# Patient Record
Sex: FEMALE | Race: OTHER | Employment: OTHER | URBAN - METROPOLITAN AREA
[De-identification: names, ages, dates, MRNs, and addresses within clinical notes are randomized per-mention and may not be internally consistent; named-entity substitution may affect disease eponyms.]

---

## 2018-10-12 ENCOUNTER — PREPPED CHART (OUTPATIENT)
Dept: URBAN - METROPOLITAN AREA CLINIC 47 | Facility: CLINIC | Age: 68
End: 2018-10-12

## 2018-10-15 ENCOUNTER — NEW PATIENT COMPREHENSIVE (OUTPATIENT)
Dept: URBAN - METROPOLITAN AREA CLINIC 47 | Facility: CLINIC | Age: 68
End: 2018-10-15

## 2018-10-15 DIAGNOSIS — H25.11: ICD-10-CM

## 2018-10-15 DIAGNOSIS — H25.12: ICD-10-CM

## 2018-10-15 PROCEDURE — 92004 COMPRE OPH EXAM NEW PT 1/>: CPT

## 2018-10-15 PROCEDURE — 92015 DETERMINE REFRACTIVE STATE: CPT

## 2018-10-15 ASSESSMENT — TONOMETRY
OD_IOP_MMHG: 14
OS_IOP_MMHG: 14

## 2018-10-15 ASSESSMENT — VISUAL ACUITY
OS_PH: 20/30
OD_SC: J12
OS_SC: J12
OS_CC: J1
OS_SC: 20/100-1
OS_CC: 20/40
OD_SC: 20/200
OD_CC: J1
OD_CC: 20/30-2

## 2018-10-31 ENCOUNTER — CATARACT CONSULT (OUTPATIENT)
Dept: URBAN - METROPOLITAN AREA CLINIC 43 | Facility: CLINIC | Age: 68
End: 2018-10-31

## 2018-10-31 VITALS
SYSTOLIC BLOOD PRESSURE: 105 MMHG | HEIGHT: 55 IN | RESPIRATION RATE: 14 BRPM | HEART RATE: 80 BPM | DIASTOLIC BLOOD PRESSURE: 66 MMHG

## 2018-10-31 DIAGNOSIS — H18.51: ICD-10-CM

## 2018-10-31 DIAGNOSIS — H25.811: ICD-10-CM

## 2018-10-31 DIAGNOSIS — H25.812: ICD-10-CM

## 2018-10-31 PROCEDURE — 92286 ANT SGM IMG I&R SPECLR MIC: CPT

## 2018-10-31 PROCEDURE — V2799I IMPRIMIS

## 2018-10-31 PROCEDURE — 92025-2 CORNEAL TOPOGRAPHY, PT

## 2018-10-31 PROCEDURE — 92136TC INTERFEROMETRY - TECHNICAL COMPONENT

## 2018-10-31 PROCEDURE — 99214 OFFICE O/P EST MOD 30 MIN: CPT

## 2018-10-31 ASSESSMENT — VISUAL ACUITY
OS_CC: J2
OS_SC: 20/100
OS_SC: J12
OD_BAT: 20/80
OS_CC: 20/30-2
OD_CC: J2
OD_SC: J12
OD_CC: 20/40-2
OS_BAT: 20/80
OS_AM: 20/20
OD_PAM: 20/20
OD_SC: 20/200

## 2018-10-31 ASSESSMENT — TONOMETRY
OS_IOP_MMHG: 14
OD_IOP_MMHG: 15

## 2018-11-28 ENCOUNTER — SURGERY/PROCEDURE (OUTPATIENT)
Dept: URBAN - METROPOLITAN AREA CLINIC 43 | Facility: CLINIC | Age: 68
End: 2018-11-28

## 2018-11-28 ENCOUNTER — PRE-OP/H&P (OUTPATIENT)
Dept: URBAN - METROPOLITAN AREA CLINIC 39 | Facility: CLINIC | Age: 68
End: 2018-11-28

## 2018-11-28 VITALS
SYSTOLIC BLOOD PRESSURE: 105 MMHG | DIASTOLIC BLOOD PRESSURE: 66 MMHG | RESPIRATION RATE: 14 BRPM | HEART RATE: 80 BPM | HEIGHT: 55 IN

## 2018-11-28 DIAGNOSIS — H25.811: ICD-10-CM

## 2018-11-28 PROCEDURE — 4040F PNEUMOC VAC/ADMIN/RCVD: CPT

## 2018-11-28 PROCEDURE — G8783 BP SCRN PERF REC INTERVAL: HCPCS

## 2018-11-28 PROCEDURE — 1036F TOBACCO NON-USER: CPT

## 2018-11-28 PROCEDURE — 66984CV REMOVE CATARACT, INSERT LENS, CUSTOM VISION

## 2018-11-28 PROCEDURE — 66999LNSR LENSAR LASER FOR CAT SX

## 2018-11-28 PROCEDURE — G8418 CALC BMI BLW LOW PARAM F/U: HCPCS

## 2018-11-28 PROCEDURE — 99211T TECH SERVICE

## 2018-11-28 PROCEDURE — G9903 PT SCRN TBCO ID AS NON USER: HCPCS

## 2018-11-28 PROCEDURE — G8482 FLU IMMUNIZE ORDER/ADMIN: HCPCS

## 2018-11-28 PROCEDURE — G8427 DOCREV CUR MEDS BY ELIG CLIN: HCPCS

## 2018-11-29 ENCOUNTER — POST OP/EVAL OF SECOND EYE (OUTPATIENT)
Dept: URBAN - METROPOLITAN AREA CLINIC 43 | Facility: CLINIC | Age: 68
End: 2018-11-29

## 2018-11-29 DIAGNOSIS — Z96.1: ICD-10-CM

## 2018-11-29 PROCEDURE — P6698455 NON-COMANAGED ADVANCED PO

## 2018-11-29 ASSESSMENT — VISUAL ACUITY
OS_BAT: 20/80
OD_SC: J6
OS_SC: J12
OD_PH: 20/50
OS_PH: 20/60-1
OS_SC: 20/100-1
OD_SC: 20/60+1

## 2018-11-29 ASSESSMENT — TONOMETRY
OS_IOP_MMHG: 15
OD_IOP_MMHG: 24

## 2018-12-04 ENCOUNTER — PRE-OP/H&P (OUTPATIENT)
Dept: URBAN - METROPOLITAN AREA CLINIC 39 | Facility: CLINIC | Age: 68
End: 2018-12-04

## 2018-12-04 ENCOUNTER — SURGERY/PROCEDURE (OUTPATIENT)
Dept: URBAN - METROPOLITAN AREA CLINIC 43 | Facility: CLINIC | Age: 68
End: 2018-12-04

## 2018-12-04 VITALS
RESPIRATION RATE: 14 BRPM | HEIGHT: 55 IN | DIASTOLIC BLOOD PRESSURE: 66 MMHG | SYSTOLIC BLOOD PRESSURE: 105 MMHG | HEART RATE: 80 BPM

## 2018-12-04 DIAGNOSIS — H25.812: ICD-10-CM

## 2018-12-04 DIAGNOSIS — Z96.1: ICD-10-CM

## 2018-12-04 PROCEDURE — 99211T TECH SERVICE

## 2018-12-04 PROCEDURE — G8783 BP SCRN PERF REC INTERVAL: HCPCS

## 2018-12-04 PROCEDURE — 65772LRI LRI DURING CAT SX

## 2018-12-04 PROCEDURE — 66999LNSR LENSAR LASER FOR CAT SX

## 2018-12-04 PROCEDURE — 4040F PNEUMOC VAC/ADMIN/RCVD: CPT

## 2018-12-04 PROCEDURE — G8482 FLU IMMUNIZE ORDER/ADMIN: HCPCS

## 2018-12-04 PROCEDURE — G9903 PT SCRN TBCO ID AS NON USER: HCPCS

## 2018-12-04 PROCEDURE — G8427 DOCREV CUR MEDS BY ELIG CLIN: HCPCS

## 2018-12-04 PROCEDURE — 66984CV REMOVE CATARACT, INSERT LENS, CUSTOM VISION

## 2018-12-04 PROCEDURE — G8418 CALC BMI BLW LOW PARAM F/U: HCPCS

## 2018-12-04 PROCEDURE — 1036F TOBACCO NON-USER: CPT

## 2018-12-04 ASSESSMENT — TONOMETRY
OS_IOP_MMHG: 14
OD_IOP_MMHG: 14

## 2018-12-04 ASSESSMENT — VISUAL ACUITY
OD_SC: J3
OD_SC: 20/30
OS_SC: 20/100
OS_SC: J12

## 2018-12-05 ENCOUNTER — CATARACT POST-OP 1-DAY (OUTPATIENT)
Dept: URBAN - METROPOLITAN AREA CLINIC 47 | Facility: CLINIC | Age: 68
End: 2018-12-05

## 2018-12-05 DIAGNOSIS — Z96.1: ICD-10-CM

## 2018-12-05 PROCEDURE — 99024 POSTOP FOLLOW-UP VISIT: CPT

## 2018-12-05 ASSESSMENT — VISUAL ACUITY
OD_SC: 20/25
OS_SC: 20/50

## 2018-12-05 ASSESSMENT — TONOMETRY
OD_IOP_MMHG: 15
OS_IOP_MMHG: 18

## 2018-12-12 ENCOUNTER — POST-OP CATARACT (OUTPATIENT)
Dept: URBAN - METROPOLITAN AREA CLINIC 47 | Facility: CLINIC | Age: 68
End: 2018-12-12

## 2018-12-12 DIAGNOSIS — Z96.1: ICD-10-CM

## 2018-12-12 DIAGNOSIS — H52.4: ICD-10-CM

## 2018-12-12 PROCEDURE — 99024 POSTOP FOLLOW-UP VISIT: CPT

## 2018-12-12 ASSESSMENT — TONOMETRY
OS_IOP_MMHG: 16
OD_IOP_MMHG: 15

## 2018-12-12 ASSESSMENT — VISUAL ACUITY
OD_SC: J1
OD_SC: 20/25-2
OS_SC: J2
OS_SC: 20/25-2

## 2019-01-02 ENCOUNTER — POST-OP CATARACT (OUTPATIENT)
Dept: URBAN - METROPOLITAN AREA CLINIC 47 | Facility: CLINIC | Age: 69
End: 2019-01-02

## 2019-01-02 DIAGNOSIS — Z96.1: ICD-10-CM

## 2019-01-02 PROCEDURE — 99024 POSTOP FOLLOW-UP VISIT: CPT

## 2019-01-02 ASSESSMENT — VISUAL ACUITY
OD_SC: J1
OS_BAT: 20/80-1
OD_BAT: 20/100-1
OS_SC: J2
OD_SC: 20/30
OU_SC: 20/25-2
OU_SC: J1
OS_SC: 20/30

## 2019-01-02 ASSESSMENT — TONOMETRY
OS_IOP_MMHG: 13
OD_IOP_MMHG: 13

## 2019-01-29 ENCOUNTER — EST. PATIENT EMERGENCY (OUTPATIENT)
Dept: URBAN - METROPOLITAN AREA CLINIC 47 | Facility: CLINIC | Age: 69
End: 2019-01-29

## 2019-01-29 DIAGNOSIS — Z96.1: ICD-10-CM

## 2019-01-29 DIAGNOSIS — H26.491: ICD-10-CM

## 2019-01-29 PROCEDURE — 99212 OFFICE O/P EST SF 10 MIN: CPT

## 2019-01-29 ASSESSMENT — VISUAL ACUITY
OD_SC: J2
OD_SC: 20/30+3
OS_SC: J2
OS_SC: 20/30+2

## 2019-01-29 ASSESSMENT — TONOMETRY
OS_IOP_MMHG: 16
OD_IOP_MMHG: 14

## 2019-02-11 ENCOUNTER — SURGERY/PROCEDURE (OUTPATIENT)
Dept: URBAN - METROPOLITAN AREA SURGERY 14 | Facility: SURGERY | Age: 69
End: 2019-02-11

## 2019-02-11 ENCOUNTER — CONSULT (OUTPATIENT)
Dept: URBAN - METROPOLITAN AREA CLINIC 39 | Facility: CLINIC | Age: 69
End: 2019-02-11

## 2019-02-11 VITALS
RESPIRATION RATE: 18 BRPM | DIASTOLIC BLOOD PRESSURE: 77 MMHG | HEART RATE: 73 BPM | SYSTOLIC BLOOD PRESSURE: 141 MMHG | HEIGHT: 55 IN

## 2019-02-11 DIAGNOSIS — H26.493: ICD-10-CM

## 2019-02-11 DIAGNOSIS — Z96.1: ICD-10-CM

## 2019-02-11 DIAGNOSIS — H52.4: ICD-10-CM

## 2019-02-11 PROCEDURE — 92014 COMPRE OPH EXAM EST PT 1/>: CPT

## 2019-02-11 PROCEDURE — 6682150 YAG CAPSULOTOMY

## 2019-02-11 ASSESSMENT — VISUAL ACUITY
OS_SC: 20/30
OD_SC: 20/30
OD_BAT: 20/100
OS_BAT: 20/80

## 2019-02-11 ASSESSMENT — TONOMETRY
OS_IOP_MMHG: 15
OD_IOP_MMHG: 15

## 2019-02-26 ENCOUNTER — EST. PATIENT EMERGENCY (OUTPATIENT)
Dept: URBAN - METROPOLITAN AREA CLINIC 43 | Facility: CLINIC | Age: 69
End: 2019-02-26

## 2019-02-26 DIAGNOSIS — Z96.1: ICD-10-CM

## 2019-02-26 ASSESSMENT — TONOMETRY
OS_IOP_MMHG: 13
OD_IOP_MMHG: 14

## 2019-02-26 ASSESSMENT — VISUAL ACUITY
OS_SC: 20/30
OD_SC: 20/25-2

## 2019-03-28 ENCOUNTER — POST-OP (OUTPATIENT)
Dept: URBAN - METROPOLITAN AREA CLINIC 47 | Facility: CLINIC | Age: 69
End: 2019-03-28

## 2019-03-28 DIAGNOSIS — Z98.890: ICD-10-CM

## 2019-03-28 DIAGNOSIS — H04.123: ICD-10-CM

## 2019-03-28 DIAGNOSIS — Z96.1: ICD-10-CM

## 2019-03-28 PROCEDURE — 99024 POSTOP FOLLOW-UP VISIT: CPT

## 2019-03-28 ASSESSMENT — VISUAL ACUITY
OS_SC: 20/25-3
OD_SC: J2
OD_SC: 20/25+2
OS_SC: J2

## 2019-03-28 ASSESSMENT — TONOMETRY
OD_IOP_MMHG: 14
OS_IOP_MMHG: 14

## 2019-09-30 ENCOUNTER — ESTABLISHED COMPREHENSIVE EXAM (OUTPATIENT)
Dept: URBAN - METROPOLITAN AREA CLINIC 47 | Facility: CLINIC | Age: 69
End: 2019-09-30

## 2019-09-30 DIAGNOSIS — Z96.1: ICD-10-CM

## 2019-09-30 DIAGNOSIS — H04.123: ICD-10-CM

## 2019-09-30 DIAGNOSIS — Z98.890: ICD-10-CM

## 2019-09-30 PROCEDURE — 92014 COMPRE OPH EXAM EST PT 1/>: CPT

## 2019-09-30 PROCEDURE — 92015 DETERMINE REFRACTIVE STATE: CPT

## 2019-09-30 ASSESSMENT — VISUAL ACUITY
OS_SC: J2
OD_SC: J2
OD_SC: 20/30+1
OS_SC: 20/25-2

## 2019-09-30 ASSESSMENT — TONOMETRY
OD_IOP_MMHG: 12
OS_IOP_MMHG: 13

## 2020-01-07 ENCOUNTER — ESTABLISHED PATIENT (OUTPATIENT)
Dept: URBAN - METROPOLITAN AREA CLINIC 44 | Facility: CLINIC | Age: 70
End: 2020-01-07

## 2020-01-07 DIAGNOSIS — D23.122: ICD-10-CM

## 2020-01-07 PROCEDURE — 99213 OFFICE O/P EST LOW 20 MIN: CPT

## 2020-01-07 PROCEDURE — 92285 EXTERNAL OCULAR PHOTOGRAPHY: CPT

## 2021-09-03 NOTE — PATIENT DISCUSSION
Retinal tear and detachment warning symptoms reviewed and patient instructed to call immediately if increasing floaters, flashes, or decreasing peripheral vision. [phone]

## 2024-01-04 ENCOUNTER — NEW PATIENT (OUTPATIENT)
Dept: URBAN - METROPOLITAN AREA CLINIC 43 | Facility: CLINIC | Age: 74
End: 2024-01-04

## 2024-01-04 DIAGNOSIS — H52.202: ICD-10-CM

## 2024-01-04 DIAGNOSIS — H52.4: ICD-10-CM

## 2024-01-04 DIAGNOSIS — H52.13: ICD-10-CM

## 2024-01-04 PROCEDURE — 92015 DETERMINE REFRACTIVE STATE: CPT

## 2024-01-04 PROCEDURE — 92004 COMPRE OPH EXAM NEW PT 1/>: CPT

## 2024-01-04 ASSESSMENT — TONOMETRY
OS_IOP_MMHG: 14
OD_IOP_MMHG: 14

## 2024-01-04 ASSESSMENT — VISUAL ACUITY
OS_SC: 20/25
OS_SC: J3
OD_SC: J1
OD_SC: 20/25-1